# Patient Record
Sex: MALE | Race: WHITE | NOT HISPANIC OR LATINO
[De-identification: names, ages, dates, MRNs, and addresses within clinical notes are randomized per-mention and may not be internally consistent; named-entity substitution may affect disease eponyms.]

---

## 2023-05-26 ENCOUNTER — APPOINTMENT (OUTPATIENT)
Dept: INTERVENTIONAL RADIOLOGY/VASCULAR | Facility: CLINIC | Age: 73
End: 2023-05-26

## 2023-05-26 DIAGNOSIS — N18.9 CHRONIC KIDNEY DISEASE, UNSPECIFIED: ICD-10-CM

## 2023-05-26 DIAGNOSIS — Z87.448 PERSONAL HISTORY OF OTHER DISEASES OF URINARY SYSTEM: ICD-10-CM

## 2023-05-26 DIAGNOSIS — Z87.891 PERSONAL HISTORY OF NICOTINE DEPENDENCE: ICD-10-CM

## 2023-05-26 DIAGNOSIS — Z78.9 OTHER SPECIFIED HEALTH STATUS: ICD-10-CM

## 2023-05-26 DIAGNOSIS — N52.9 MALE ERECTILE DYSFUNCTION, UNSPECIFIED: ICD-10-CM

## 2023-05-26 DIAGNOSIS — C92.10 CHRONIC MYELOID LEUKEMIA, BCR/ABL-POSITIVE, NOT HAVING ACHIEVED REMISSION: ICD-10-CM

## 2023-05-26 DIAGNOSIS — I10 ESSENTIAL (PRIMARY) HYPERTENSION: ICD-10-CM

## 2023-05-26 PROBLEM — Z00.00 ENCOUNTER FOR PREVENTIVE HEALTH EXAMINATION: Status: ACTIVE | Noted: 2023-05-26

## 2023-05-26 PROCEDURE — XXXXX: CPT | Mod: 1L

## 2023-05-26 NOTE — PHYSICAL EXAM
02-Feb-2019 11:24 31-Jan-2019 01:33 31-Jan-2019 09:29 30-Jan-2019 23:30 [Alert] : alert [Normal Voice/Communication] : normal voice communication [No Respiratory Distress] : no respiratory distress [Oriented x3] : oriented to person, place, and time [Normal Insight/Judgement] : insight and judgment were intact [Normal Affect] : the affect was normal [Normal Mood] : the mood was normal [Recent Memory Normal] : recent memory was not impaired [Remote Memory Normal] : remote memory was not impaired [Fully active, able to carry on all pre-disease performance without restriction] : Fully active, able to carry on all pre-disease performance without restriction

## 2023-06-01 ENCOUNTER — OUTPATIENT (OUTPATIENT)
Dept: OUTPATIENT SERVICES | Facility: HOSPITAL | Age: 73
LOS: 1 days | End: 2023-06-01
Payer: COMMERCIAL

## 2023-06-01 VITALS
RESPIRATION RATE: 14 BRPM | HEART RATE: 62 BPM | WEIGHT: 257.06 LBS | OXYGEN SATURATION: 98 % | DIASTOLIC BLOOD PRESSURE: 81 MMHG | HEIGHT: 65 IN | TEMPERATURE: 98 F | SYSTOLIC BLOOD PRESSURE: 137 MMHG

## 2023-06-01 DIAGNOSIS — Z98.890 OTHER SPECIFIED POSTPROCEDURAL STATES: Chronic | ICD-10-CM

## 2023-06-01 DIAGNOSIS — Z01.818 ENCOUNTER FOR OTHER PREPROCEDURAL EXAMINATION: ICD-10-CM

## 2023-06-01 DIAGNOSIS — N40.1 BENIGN PROSTATIC HYPERPLASIA WITH LOWER URINARY TRACT SYMPTOMS: ICD-10-CM

## 2023-06-01 DIAGNOSIS — Z90.79 ACQUIRED ABSENCE OF OTHER GENITAL ORGAN(S): Chronic | ICD-10-CM

## 2023-06-01 LAB
ANION GAP SERPL CALC-SCNC: 12 MMOL/L — SIGNIFICANT CHANGE UP (ref 5–17)
BUN SERPL-MCNC: 32 MG/DL — HIGH (ref 7–23)
CALCIUM SERPL-MCNC: 9.4 MG/DL — SIGNIFICANT CHANGE UP (ref 8.4–10.5)
CHLORIDE SERPL-SCNC: 99 MMOL/L — SIGNIFICANT CHANGE UP (ref 96–108)
CO2 SERPL-SCNC: 24 MMOL/L — SIGNIFICANT CHANGE UP (ref 22–31)
CREAT SERPL-MCNC: 2.31 MG/DL — HIGH (ref 0.5–1.3)
EGFR: 29 ML/MIN/1.73M2 — LOW
GLUCOSE SERPL-MCNC: 84 MG/DL — SIGNIFICANT CHANGE UP (ref 70–99)
HCT VFR BLD CALC: 31 % — LOW (ref 39–50)
HGB BLD-MCNC: 10.2 G/DL — LOW (ref 13–17)
MCHC RBC-ENTMCNC: 29.6 PG — SIGNIFICANT CHANGE UP (ref 27–34)
MCHC RBC-ENTMCNC: 32.9 GM/DL — SIGNIFICANT CHANGE UP (ref 32–36)
MCV RBC AUTO: 89.9 FL — SIGNIFICANT CHANGE UP (ref 80–100)
NRBC # BLD: 0 /100 WBCS — SIGNIFICANT CHANGE UP (ref 0–0)
PLATELET # BLD AUTO: 269 K/UL — SIGNIFICANT CHANGE UP (ref 150–400)
POTASSIUM SERPL-MCNC: 3.8 MMOL/L — SIGNIFICANT CHANGE UP (ref 3.5–5.3)
POTASSIUM SERPL-SCNC: 3.8 MMOL/L — SIGNIFICANT CHANGE UP (ref 3.5–5.3)
RBC # BLD: 3.45 M/UL — LOW (ref 4.2–5.8)
RBC # FLD: 13.2 % — SIGNIFICANT CHANGE UP (ref 10.3–14.5)
SODIUM SERPL-SCNC: 135 MMOL/L — SIGNIFICANT CHANGE UP (ref 135–145)
WBC # BLD: 8.7 K/UL — SIGNIFICANT CHANGE UP (ref 3.8–10.5)
WBC # FLD AUTO: 8.7 K/UL — SIGNIFICANT CHANGE UP (ref 3.8–10.5)

## 2023-06-01 PROCEDURE — 85027 COMPLETE CBC AUTOMATED: CPT

## 2023-06-01 PROCEDURE — G0463: CPT

## 2023-06-01 PROCEDURE — 80048 BASIC METABOLIC PNL TOTAL CA: CPT

## 2023-06-01 NOTE — H&P PST ADULT - NSICDXPASTSURGICALHX_GEN_ALL_CORE_FT
PAST SURGICAL HISTORY:  H/O colonoscopy     H/O endoscopy     History of parathyroid surgery     S/P TURP     Status post laser lithotripsy of ureteral calculus

## 2023-06-01 NOTE — H&P PST ADULT - HISTORY OF PRESENT ILLNESS
73 year old male with PMH of CML on Bosulif, HTN, Renal Insufficiency, Bladder Stone, S/P laser Ablation of Bladder Stone on 5/22/23 @ [facility], and BPH with obstructive symptoms and hematuria now with gross hematuri    Patient endorses that he was diagnosed with BPH at age 59 and he has been following with his urologist, Dr. Barragan. He reported having issues with obstructive symptoms in late 2019 and underwent Greenlight laser ablation of the prostate in 3/2020    Post procedure, he had improvement of his obstructive symptoms initially, but over the last year, he started to notice nocturia 1-3x/night, urgency/frequency.     More recently he had episode of hematuria about 1 month ago, found to have bladder stones again. . Post procedure, he developed hematuria that required CBI. He returned today to his urologist's office and the kahn was removed. After removal significant amount of bleeding was noted via the meatus, so the kahn was replaced. He is now referred to IR by his urologist for evaluation for prostate artery embolization. He reports that his kahn catheter was removed on 5/31/2023. He endorses gross hematuria, he denies fever, chills, flank pain or dysuria. He prsent         73 year old male with PMH of CML on Bosulif, HTN, Obesity (BMI 43), Renal Insufficiency, Bladder Stone, S/P laser Ablation of Bladder Stone on 5/22/23 @ [facility], and BPH with obstructive symptoms and hematuria. Patient endorses that he was diagnosed with BPH at age 59 and he has been following with his urologist, Dr. Barragan. He underwent Greenlight laser ablation of the prostate in 3/2020 due to obstructive symptoms. He reports that post procedure, he had improvement of his obstructive symptoms initially, but recently started to notice nocturia 1-3 times/night, urinary urgency, frequency and episodes of gross hematuria. He is now referred to IR by his urologist for evaluation for prostate artery embolization. He reports that his kahn catheter was removed on 5/31/2023. He endorses cranberry colored urine, he denies fever, chills, flank pain or dysuria. He presents to PST prior to scheduled PAE on 6/6/2023.

## 2023-06-01 NOTE — H&P PST ADULT - OTHER CARE PROVIDERS
Hematologist/Oncologist- Cleopatra Crews, [phone] // Urologist--Demetri Solorzano, [phone]

## 2023-06-01 NOTE — H&P PST ADULT - NSICDXPASTMEDICALHX_GEN_ALL_CORE_FT
PAST MEDICAL HISTORY:  CML (chronic myelocytic leukemia)     History of BPH     HTN (hypertension)     Nephrolithiasis     Parathyroid adenoma     Renal insufficiency     Severe obesity (BMI >= 40)

## 2023-06-01 NOTE — H&P PST ADULT - PROBLEM SELECTOR PLAN 1
PAE  -cbc, bmp drawn @ PST  -obtain last medical note (medical clearance done recently for urology procedure)  -preop  instructions provided. All questions answered  -patient to arrive early for IV hydration (TAMERA prophylaxis)

## 2023-06-01 NOTE — H&P PST ADULT - EYES COMMENTS
Problem: Physiological Stability  Goal: Vital signs and physical assessments stable and within expected parameters  Outcome: Outcome Met, Continue evaluating goal progress toward completion  Goal: Remains free of signs and symptoms of infection  Outcome: Outcome Met, Continue evaluating goal progress toward completion     Problem: Thermoregulation  Goal: Body temperature maintained within normal range  Outcome: Outcome Met, Continue evaluating goal progress toward completion     Problem: Pain  Goal: Acceptable level of comfort exhibited by infant (based on N-Pass/NIPS Scoring)  Outcome: Outcome Met, Continue evaluating goal progress toward completion     Problem: Physiological Stability  Goal: Parent / caregiver verbalizes / demonstrates comfort with their ability to care for infant and familiarity with community resources prior to discharge  Description: Document on Patient Education Activity  Outcome: Outcome Not Met, Continue to Monitor     Problem: Skin Integrity  Goal: Skin integrity is maintained or improved (skin will be intact without erythma or breakdown)  Outcome: Outcome Not Met, Continue to Monitor     Problem: Alteration in Family Bonding  Goal: Family Interaction is supported  Outcome: Outcome Not Met, Continue to Monitor  Goal: Family demonstrates appropriate coping mechanisms  Outcome: Outcome Not Met, Continue to Monitor     Problem: Nutrition  Goal: Tolerates feedings  Outcome: Outcome Not Met, Continue to Monitor  Goal: Consumes sufficient dietary intake without complications  Outcome: Outcome Not Met, Continue to Monitor  Goal: Progresses toward ability to receive all feeding from breast or bottle  Outcome: Outcome Not Met, Continue to Monitor  Goal: Achieves catch up weight gain and growth consistent with birth weight percentile  Outcome: Outcome Not Met, Continue to Monitor  Goal: Parent/ caregiver verbalizes understanding of milk preparation,  storage and bottle feeding  techniques  Description: Document on Patient Education Activity  Outcome: Outcome Not Met, Continue to Monitor     Problem: Breastfeeding  Goal: Breast milk supply is established and maintained to provide breast milk to infant in accordance with mother's preference  Outcome: Outcome Not Met, Continue to Monitor  Goal: Successful breastfeeding as evidenced by proper latch and adequate suck/ swallow  Outcome: Outcome Not Met, Continue to Monitor  Goal: Parent / caregiver verbalizes understanding of benefits of exclusive breastfeeding and breastfeeding techniques  Description: Document on Patient Education Activity  Outcome: Outcome Not Met, Continue to Monitor     Problem: Risk of altered growth and development secondary to gestational age or condition  Goal: Demonstrates improved/ appropriate age-corrected neurobehavioral competence at time of discharge or will be referred for ongoing therapy  Outcome: Outcome Not Met, Continue to Monitor  Goal: Parent / caregiver verbalizes understanding of developmentally appropriate interaction & environment  Description: Document on Patient Education Activity  Outcome: Outcome Not Met, Continue to Monitor  Goal: Parent / caregiver verbalizes understanding of risk for RSV and prevention  Description: Document on Patient Education Activity  Outcome: Outcome Not Met, Continue to Monitor     Problem:  Abstinence Syndrome (DEYSI)  Goal: Infant will withdraw from opiates or other drug exposure with a minimum of adverse physiologic behavioral symptoms and effects  Outcome: Outcome Met, Complete Goal  Goal: Parent / caregiver verbalizes understanding of care of infant with DEYSI  Description: Document on Patient Education Activity  Outcome: Outcome Met, Complete Goal      wears corrective lenses

## 2023-06-02 NOTE — REASON FOR VISIT
[Home] : at home, [unfilled] , at the time of the visit. [Medical Office: ([facility])___] : at the medical office located in  [Spouse] : spouse [Verbal consent obtained from patient] : the patient, [unfilled] [Consultation] : a consultation visit [FreeTextEntry1] : prostate artery embolization

## 2023-06-02 NOTE — HISTORY OF PRESENT ILLNESS
[FreeTextEntry1] : This is a 74 y/o male with pmhx of CML, HTN, and BPH who presents today for consultation for prostate artery embolization.  \par \par Pt diagnosed with BPH at age 60 y/o and he has been following with his urologist, Dr. Barragan.  He reported having issues with obstructive symptoms in late 2019 and underwent Greenlight laser ablation of the prostate in 3/2020, along with cystoscopy with lithopaxy for bladder stones.  \par \par Post procedure, he had improvement of his obstructive symptoms initially, but over the last year, he started to notice nocturia 1-3x/night, urgency/frequency.  \par \par More recently he had episode of hematuria about 1 month ago, found to have bladder stones again.  He underwent laser ablation of bladder stone on 5/22/23.  Post procedure, he developed hematuria that required CBI.  He returned today to his urologist's office and the kahn was removed.  After removal significant amount of bleeding was noted via the meatus, so the kahn was replaced.  He is now referred to IR by his urologist to discuss prostate artery embolization.  \par \par Prior to having kahn removed today, he noted his urine to be a dark red wine color.  Since reinsertion, it is now a lighter shade of pink.  \par \par Meds: \par amlodipine 10mg daily\par colace 100mg bid\par doxazosin 8mg hs\par pepcid 40 mg hs prn\par monopril 40mg daily\par HCTZ 25mg daily\par k 20meq 3 tabs daily\par cialis prn\par bosutinib 100mg daily\par \par : [place] [phone], fax [phone]\par PMD: Dave Fajardo [phone]\par Heme/Onc: Cleopatra Finney [phone]\par \par \par

## 2023-06-02 NOTE — ASSESSMENT
[Other: _____] : [unfilled] [FreeTextEntry1] : This is a 72 y/o male with pmhx of CML, HTN, and BPH who presents today for consultation for prostate artery embolization.  \par \par 1.  BPH with obstructive symptoms and hematuria\par - pt with hx of obstructive symptoms s/p Greenlight laser ablation of prostate in 3/2020, with improvement x 1 year, then recent return of nocturia, frequency, and urgency\par - now with gross hematuria after laser ablation of bladder stone and minor TURB bladder neck on 5/22/23\par - pt currently has kahn catheter in place, failed attempt at removal today due to bleeding from meatus\par - recommending PAE to control hematuria and improve obstructive symptoms\par - I discussed PAE procedure including the risks ( possible non-targeted embolization, post embolization syndrome which may be comprised of flu-like s/s, low grade fever, n/v, pelvic discomfort lasting 1-2 weeks, or 1-2 episodes of gross hematuria with or without clot passage that may occur in the initial months post PAE as the prostate remodels), benefits, alternatives, and expected post procedure course.\par - pt to follow up with Dr. Barragan within 1 week for kahn catheter management\par -BPH medication regimen will be continued initially post PAE and possibly adjusted in the future as your symptoms improve.  \par - reviewed post procedure meds\par - will make arrangements\par \par 2.  CKD\par - pt reports hx of elevated renal function, unsure what his baseline was\par - 5/24/23 BUN/Cr 22/2/1\par - pt unable to have CTA preprocedure due to renal function\par - will hold off on post procedure Ibuprofen given renal function/bleeding\par - pt to come in early for IV hydration for TAMERA prophylaxis\par - repeat  bmp 72 hours post procedure\par \par I have provided the patient the opportunity to ask questions and have answered them to their satisfaction.  They are encouraged to contact our office with any further questions, concerns, or issues.\par

## 2023-06-06 ENCOUNTER — TRANSCRIPTION ENCOUNTER (OUTPATIENT)
Age: 73
End: 2023-06-06

## 2023-06-06 ENCOUNTER — RESULT REVIEW (OUTPATIENT)
Age: 73
End: 2023-06-06

## 2023-06-06 ENCOUNTER — OUTPATIENT (OUTPATIENT)
Dept: OUTPATIENT SERVICES | Facility: HOSPITAL | Age: 73
LOS: 1 days | End: 2023-06-06
Payer: COMMERCIAL

## 2023-06-06 VITALS
OXYGEN SATURATION: 100 % | DIASTOLIC BLOOD PRESSURE: 59 MMHG | SYSTOLIC BLOOD PRESSURE: 144 MMHG | RESPIRATION RATE: 18 BRPM | HEART RATE: 52 BPM

## 2023-06-06 VITALS
TEMPERATURE: 98 F | RESPIRATION RATE: 18 BRPM | SYSTOLIC BLOOD PRESSURE: 159 MMHG | WEIGHT: 255.07 LBS | HEIGHT: 65 IN | DIASTOLIC BLOOD PRESSURE: 71 MMHG | HEART RATE: 60 BPM | OXYGEN SATURATION: 99 %

## 2023-06-06 DIAGNOSIS — Z98.890 OTHER SPECIFIED POSTPROCEDURAL STATES: Chronic | ICD-10-CM

## 2023-06-06 DIAGNOSIS — R31.9 HEMATURIA, UNSPECIFIED: ICD-10-CM

## 2023-06-06 DIAGNOSIS — N40.1 BENIGN PROSTATIC HYPERPLASIA WITH LOWER URINARY TRACT SYMPTOMS: ICD-10-CM

## 2023-06-06 DIAGNOSIS — Z90.79 ACQUIRED ABSENCE OF OTHER GENITAL ORGAN(S): Chronic | ICD-10-CM

## 2023-06-06 DIAGNOSIS — G89.18 OTHER ACUTE POSTPROCEDURAL PAIN: ICD-10-CM

## 2023-06-06 PROCEDURE — 76937 US GUIDE VASCULAR ACCESS: CPT | Mod: 26

## 2023-06-06 PROCEDURE — 37244 VASC EMBOLIZE/OCCLUDE BLEED: CPT

## 2023-06-06 PROCEDURE — 36247 INS CATH ABD/L-EXT ART 3RD: CPT | Mod: 50

## 2023-06-06 PROCEDURE — 76937 US GUIDE VASCULAR ACCESS: CPT

## 2023-06-06 PROCEDURE — 36247 INS CATH ABD/L-EXT ART 3RD: CPT | Mod: 59

## 2023-06-06 PROCEDURE — C1769: CPT

## 2023-06-06 PROCEDURE — C1887: CPT

## 2023-06-06 PROCEDURE — C1894: CPT

## 2023-06-06 PROCEDURE — C1760: CPT

## 2023-06-06 PROCEDURE — C1889: CPT

## 2023-06-06 PROCEDURE — 76380 CAT SCAN FOLLOW-UP STUDY: CPT | Mod: 59

## 2023-06-06 PROCEDURE — 76380 CAT SCAN FOLLOW-UP STUDY: CPT | Mod: 26,59,MH

## 2023-06-06 RX ORDER — BOSUTINIB 50 MG/1
1 CAPSULE ORAL
Refills: 0 | DISCHARGE

## 2023-06-06 RX ORDER — DOCUSATE SODIUM 100 MG
3 CAPSULE ORAL
Refills: 0 | DISCHARGE

## 2023-06-06 RX ORDER — HYDROCHLOROTHIAZIDE 25 MG
1 TABLET ORAL
Refills: 0 | DISCHARGE

## 2023-06-06 RX ORDER — DOXAZOSIN MESYLATE 4 MG
1 TABLET ORAL
Refills: 0 | DISCHARGE

## 2023-06-06 RX ORDER — POTASSIUM CHLORIDE 20 MEQ
1 PACKET (EA) ORAL
Refills: 0 | DISCHARGE

## 2023-06-06 RX ORDER — AMLODIPINE BESYLATE 2.5 MG/1
1 TABLET ORAL
Refills: 0 | DISCHARGE

## 2023-06-06 RX ORDER — FOSINOPRIL SODIUM 10 MG/1
1 TABLET ORAL
Refills: 0 | DISCHARGE

## 2023-06-06 RX ORDER — CHOLECALCIFEROL (VITAMIN D3) 125 MCG
1 CAPSULE ORAL
Refills: 0 | DISCHARGE

## 2023-06-06 RX ORDER — CIPROFLOXACIN LACTATE 400MG/40ML
400 VIAL (ML) INTRAVENOUS ONCE
Refills: 0 | Status: DISCONTINUED | OUTPATIENT
Start: 2023-06-06 | End: 2023-06-20

## 2023-06-06 NOTE — ASU PATIENT PROFILE, ADULT - FALL HARM RISK - UNIVERSAL INTERVENTIONS
Bed in lowest position, wheels locked, appropriate side rails in place/Call bell, personal items and telephone in reach/Instruct patient to call for assistance before getting out of bed or chair/Non-slip footwear when patient is out of bed/[place] to call system/Physically safe environment - no spills, clutter or unnecessary equipment/Purposeful Proactive Rounding/Room/bathroom lighting operational, light cord in reach

## 2023-06-06 NOTE — ASU DISCHARGE PLAN (ADULT/PEDIATRIC) - ASU DC SPECIAL INSTRUCTIONSFT
You underwent a Prostate Artery Embolization (PAE) procedure with Dr Black on 6/6/23.    - You may experience PAE Syndrome for the next few days for up to 1 week, which may consist of a low grade fever (below 101 F), flu-like symptoms, body aches, nausea, vomiting, pelvic pressure / pain, painful urination - this is normal. If you were prescribed post - op medications, take them as prescribed for symptom relief.     - Urine, semen or bowel movements may be blood-tinged for the next few days to weeks - this is normal.    - You may experience 1-2 incidents of blood in your urine with or without the passage of clots, which may last a couple of days per incident, over the next couple of months - this is normal as your prostate remodels.    The Following Medications Have Been Ordered to Your Pharmacy:  - Ibuprofen 600 mg MUST BE TAKEN every 8 hours with food for three days to minimize postprocedural inflammation/pain     *If you are on blood thinning medications, Ibuprofen will not be prescribed - an alternative medication such as a Medrol Dose Pack may be ordered*  - Pyridium 100 mg may be taken AS NEEDED up to three times daily for five days for urethral burning      Follow - Up Requirements:  -  Please call your urologist to determine when to schedule a follow up appointment.   If you were discharged with a kahn catheter, your urologist will determine when it can be removed. Your urologist will also manage your prostate prescriptions, if you are currently on medications.    - Please call the Interventional Radiology office @ [phone] to schedule a ONE MONTH follow up consultation with your IR Doctor. Due to the current pandemic, this may be conducted via Telehealth services, for your safety.      Should you have any questions or concerns regarding the above, please call the IR Nurse Practitioner at [phone] or [phone] Mon - Fri 8 am -4 pm.      ***If you develop a problem that you believe requires IMMEDIATE attention, please go to your NEAREST Emergency Room or call 911***     *If you believe your problem can safely wait until you speak to a Doctor, please call your Urologist & IR at [phone]. After 6 pm on weekdays, or on weekends or holidays, please call [facility] at [phone] and ask for the" Interventional Radiology Resident on call"*  Symptoms to Report include:  Inability to urinate once you are discharged, develop a fever above 101, prolonged burning or painful urination, foul-smelling urine, inability to urinate, more than 1-2 episodes of blood in your urine in a month / right groin access site pain, bleeding, bruising that spreads or hardened area below the skin / right leg numbness, tingling or pain.

## 2023-06-06 NOTE — PRE PROCEDURE NOTE - PRE PROCEDURE EVALUATION
Interventional Radiology    HPI: 73 year old male with PMH of CML on Bosulif, HTN, Obesity (BMI 43), Renal Insufficiency, Bladder Stone, S/P laser Ablation of Bladder Stone on 5/22/23 @ [facility], and BPH with obstructive symptoms and hematuria. Patient endorses that he was diagnosed with BPH at age 59 and he has been following with his urologist, Dr. Barragan. He underwent Greenlight laser ablation of the prostate in 3/2020 due to obstructive symptoms. He reports that post procedure, he had improvement of his obstructive symptoms initially, but recently started to notice nocturia 1-3 times/night, urinary urgency, frequency and episodes of gross hematuria. He is now referred to IR by his urologist for evaluation for prostate artery embolization. He endorses cranberry colored urine, he denies fever, chills, flank pain or dysuria. Patient presents to IR for prostate artery embolization.     NPO since 8pm last night  Denies adverse events from anesthesia in the past  Denies use of anticoagulation  No objection to blood products.     Allergies: penicillin (Rash)    Medications (Abx/Cardiac/Anticoagulation/Blood Products)      Data:  165.1  115.7  T(C): 36.7  HR: 60  BP: 159/71  RR: 18  SpO2: 99%    Exam  General: No acute distress, A&Ox4  Chest: Non labored breathing    -WBC 8.70 / HgB 10.2 / Hct 31.0 / Plt 269  -Na 135 / Cl 99 / BUN 32 / Glucose 84  -K 3.8 / CO2 24 / Cr 2.31  -ALT -- / Alk Phos -- / T.Bili --      Plan: 73y Male presents for prostate artery embolization  -Risks/Benefits/alternatives explained with the patient and/or healthcare proxy and witnessed informed consent obtained.

## 2023-06-06 NOTE — ASU DISCHARGE PLAN (ADULT/PEDIATRIC) - CALL YOUR DOCTOR IF YOU HAVE ANY OF THE FOLLOWING:
Bleeding that does not stop/Swelling that gets worse/Wound/Surgical Site with redness, or foul smelling discharge or pus/Numbness, tingling, color or temperature change to extremity/Unable to urinate

## 2023-06-06 NOTE — ASU DISCHARGE PLAN (ADULT/PEDIATRIC) - NURSING INSTRUCTIONS
Please feel free to contact us at [phone] if any problems arise. After 6PM, Monday through Friday, on weekends and on holidays, please call [phone] and ask for the radiology resident on call to be paged.

## 2023-06-07 ENCOUNTER — NON-APPOINTMENT (OUTPATIENT)
Age: 73
End: 2023-06-07

## 2023-06-08 ENCOUNTER — NON-APPOINTMENT (OUTPATIENT)
Age: 73
End: 2023-06-08

## 2023-06-08 LAB
ANION GAP SERPL CALC-SCNC: 19 MMOL/L
BUN SERPL-MCNC: 24 MG/DL
CALCIUM SERPL-MCNC: 9.7 MG/DL
CHLORIDE SERPL-SCNC: 98 MMOL/L
CO2 SERPL-SCNC: 23 MMOL/L
CREAT SERPL-MCNC: 2.31 MG/DL
EGFR: 29 ML/MIN/1.73M2
GLUCOSE SERPL-MCNC: 121 MG/DL
POTASSIUM SERPL-SCNC: 4 MMOL/L
SODIUM SERPL-SCNC: 140 MMOL/L

## 2023-06-22 PROBLEM — D35.1 BENIGN NEOPLASM OF PARATHYROID GLAND: Chronic | Status: ACTIVE | Noted: 2023-06-01

## 2023-06-22 PROBLEM — N28.9 DISORDER OF KIDNEY AND URETER, UNSPECIFIED: Chronic | Status: ACTIVE | Noted: 2023-06-01

## 2023-06-22 PROBLEM — N20.0 CALCULUS OF KIDNEY: Chronic | Status: ACTIVE | Noted: 2023-06-01

## 2023-06-22 PROBLEM — C92.10 CHRONIC MYELOID LEUKEMIA, BCR/ABL-POSITIVE, NOT HAVING ACHIEVED REMISSION: Chronic | Status: ACTIVE | Noted: 2023-06-01

## 2023-06-22 PROBLEM — I10 ESSENTIAL (PRIMARY) HYPERTENSION: Chronic | Status: ACTIVE | Noted: 2023-06-01

## 2023-06-22 PROBLEM — Z87.438 PERSONAL HISTORY OF OTHER DISEASES OF MALE GENITAL ORGANS: Chronic | Status: ACTIVE | Noted: 2023-06-01

## 2023-06-22 PROBLEM — E66.01 MORBID (SEVERE) OBESITY DUE TO EXCESS CALORIES: Chronic | Status: ACTIVE | Noted: 2023-06-01

## 2023-07-17 ENCOUNTER — APPOINTMENT (OUTPATIENT)
Dept: INTERVENTIONAL RADIOLOGY/VASCULAR | Facility: CLINIC | Age: 73
End: 2023-07-17
Payer: COMMERCIAL

## 2023-07-17 DIAGNOSIS — Z87.898 PERSONAL HISTORY OF OTHER SPECIFIED CONDITIONS: ICD-10-CM

## 2023-07-17 DIAGNOSIS — Z97.8 PRESENCE OF OTHER SPECIFIED DEVICES: ICD-10-CM

## 2023-07-17 DIAGNOSIS — N40.1 BENIGN PROSTATIC HYPERPLASIA WITH LOWER URINARY TRACT SYMPMS: ICD-10-CM

## 2023-07-17 DIAGNOSIS — N13.8 BENIGN PROSTATIC HYPERPLASIA WITH LOWER URINARY TRACT SYMPMS: ICD-10-CM

## 2023-07-17 DIAGNOSIS — R35.1 BENIGN PROSTATIC HYPERPLASIA WITH LOWER URINARY TRACT SYMPMS: ICD-10-CM

## 2023-07-17 PROCEDURE — 99443: CPT

## 2023-07-17 RX ORDER — DOXAZOSIN 4 MG/1
4 TABLET ORAL
Refills: 0 | Status: ACTIVE | COMMUNITY

## 2023-07-17 RX ORDER — METHYLPREDNISOLONE 4 MG/1
4 TABLET ORAL
Qty: 1 | Refills: 0 | Status: DISCONTINUED | COMMUNITY
Start: 2023-06-06 | End: 2023-07-17

## 2023-07-17 RX ORDER — PHENAZOPYRIDINE 100 MG/1
100 TABLET, FILM COATED ORAL 3 TIMES DAILY
Qty: 15 | Refills: 0 | Status: DISCONTINUED | COMMUNITY
Start: 2023-05-26 | End: 2023-07-17

## 2023-07-17 RX ORDER — AMLODIPINE BESYLATE 10 MG/1
10 TABLET ORAL
Refills: 0 | Status: ACTIVE | COMMUNITY

## 2023-07-17 RX ORDER — FOSINOPRIL SODIUM 40 MG
40 TABLET ORAL
Refills: 0 | Status: ACTIVE | COMMUNITY

## 2024-10-31 NOTE — ASU PATIENT PROFILE, ADULT - PATIENT KNOW
Continue current dose of warfarin as instructed on dosing calendar provided.   Continue to monitor urine and stool for signs and symptoms of bleeding.   Please notify the clinic of any medication changes.   Please remember to bring all medications (both prescription and OTC) to your next visit. Kindly notify the clinic if you are unable to make to your next appointment.           
yes

## 2025-03-27 ENCOUNTER — NON-APPOINTMENT (OUTPATIENT)
Age: 75
End: 2025-03-27

## 2025-04-07 ENCOUNTER — NON-APPOINTMENT (OUTPATIENT)
Age: 75
End: 2025-04-07

## 2025-04-07 ENCOUNTER — APPOINTMENT (OUTPATIENT)
Dept: OTOLARYNGOLOGY | Facility: CLINIC | Age: 75
End: 2025-04-07
Payer: MEDICARE

## 2025-04-07 VITALS
WEIGHT: 242 LBS | SYSTOLIC BLOOD PRESSURE: 180 MMHG | HEIGHT: 64 IN | BODY MASS INDEX: 41.32 KG/M2 | HEART RATE: 60 BPM | DIASTOLIC BLOOD PRESSURE: 80 MMHG | TEMPERATURE: 98 F

## 2025-04-07 DIAGNOSIS — R09.89 OTHER SPECIFIED SYMPTOMS AND SIGNS INVOLVING THE CIRCULATORY AND RESPIRATORY SYSTEMS: ICD-10-CM

## 2025-04-07 DIAGNOSIS — R09.82 POSTNASAL DRIP: ICD-10-CM

## 2025-04-07 DIAGNOSIS — R49.0 DYSPHONIA: ICD-10-CM

## 2025-04-07 DIAGNOSIS — K21.9 GASTRO-ESOPHAGEAL REFLUX DISEASE W/OUT ESOPHAGITIS: ICD-10-CM

## 2025-04-07 PROCEDURE — 99203 OFFICE O/P NEW LOW 30 MIN: CPT | Mod: 25

## 2025-04-07 PROCEDURE — 31575 DIAGNOSTIC LARYNGOSCOPY: CPT

## 2025-04-07 PROCEDURE — 69210 REMOVE IMPACTED EAR WAX UNI: CPT

## 2025-04-07 RX ORDER — FLUTICASONE PROPIONATE 50 UG/1
50 SPRAY, METERED NASAL DAILY
Qty: 1 | Refills: 3 | Status: ACTIVE | COMMUNITY
Start: 2025-04-07 | End: 1900-01-01

## 2025-04-07 RX ORDER — OMEPRAZOLE 20 MG/1
20 CAPSULE, DELAYED RELEASE ORAL
Qty: 30 | Refills: 0 | Status: DISCONTINUED | COMMUNITY
Start: 2025-04-07 | End: 2025-04-07

## 2025-04-07 RX ORDER — METHYLPREDNISOLONE 4 MG/1
4 TABLET ORAL
Qty: 1 | Refills: 0 | Status: ACTIVE | COMMUNITY
Start: 2025-04-07 | End: 1900-01-01

## 2025-04-07 RX ORDER — OMEPRAZOLE 20 MG/1
20 CAPSULE, DELAYED RELEASE ORAL
Qty: 90 | Refills: 0 | Status: ACTIVE | COMMUNITY
Start: 2025-04-07 | End: 1900-01-01

## 2025-04-07 RX ORDER — METHYLPREDNISOLONE 4 MG/1
4 TABLET ORAL
Qty: 1 | Refills: 0 | Status: DISCONTINUED | COMMUNITY
Start: 2025-04-07 | End: 2025-04-07

## 2025-04-11 ENCOUNTER — APPOINTMENT (OUTPATIENT)
Dept: ULTRASOUND IMAGING | Facility: IMAGING CENTER | Age: 75
End: 2025-04-11

## 2025-04-11 ENCOUNTER — OUTPATIENT (OUTPATIENT)
Dept: OUTPATIENT SERVICES | Facility: HOSPITAL | Age: 75
LOS: 1 days | End: 2025-04-11
Payer: MEDICARE

## 2025-04-11 DIAGNOSIS — I10 ESSENTIAL (PRIMARY) HYPERTENSION: ICD-10-CM

## 2025-04-11 DIAGNOSIS — Z98.890 OTHER SPECIFIED POSTPROCEDURAL STATES: Chronic | ICD-10-CM

## 2025-04-11 DIAGNOSIS — E87.5 HYPERKALEMIA: ICD-10-CM

## 2025-04-11 DIAGNOSIS — N17.9 ACUTE KIDNEY FAILURE, UNSPECIFIED: ICD-10-CM

## 2025-04-11 DIAGNOSIS — Z90.79 ACQUIRED ABSENCE OF OTHER GENITAL ORGAN(S): Chronic | ICD-10-CM

## 2025-04-11 PROCEDURE — 93975 VASCULAR STUDY: CPT

## 2025-04-11 PROCEDURE — 93975 VASCULAR STUDY: CPT | Mod: 26

## 2025-05-13 ENCOUNTER — APPOINTMENT (OUTPATIENT)
Dept: OTOLARYNGOLOGY | Facility: CLINIC | Age: 75
End: 2025-05-13
Payer: MEDICARE

## 2025-05-13 DIAGNOSIS — R09.89 OTHER SPECIFIED SYMPTOMS AND SIGNS INVOLVING THE CIRCULATORY AND RESPIRATORY SYSTEMS: ICD-10-CM

## 2025-05-13 DIAGNOSIS — H61.23 IMPACTED CERUMEN, BILATERAL: ICD-10-CM

## 2025-05-13 DIAGNOSIS — R09.82 POSTNASAL DRIP: ICD-10-CM

## 2025-05-13 PROCEDURE — 31575 DIAGNOSTIC LARYNGOSCOPY: CPT

## 2025-05-13 PROCEDURE — 99213 OFFICE O/P EST LOW 20 MIN: CPT | Mod: 25

## 2025-05-13 PROCEDURE — 69210 REMOVE IMPACTED EAR WAX UNI: CPT

## 2025-07-08 ENCOUNTER — RX RENEWAL (OUTPATIENT)
Age: 75
End: 2025-07-08